# Patient Record
Sex: FEMALE | Race: WHITE | NOT HISPANIC OR LATINO | Employment: STUDENT | URBAN - METROPOLITAN AREA
[De-identification: names, ages, dates, MRNs, and addresses within clinical notes are randomized per-mention and may not be internally consistent; named-entity substitution may affect disease eponyms.]

---
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
## 2023-07-08 ENCOUNTER — OFFICE VISIT (OUTPATIENT)
Dept: URGENT CARE | Facility: URGENT CARE | Age: 18
End: 2023-07-08
Payer: COMMERCIAL

## 2023-07-08 VITALS
OXYGEN SATURATION: 100 % | HEART RATE: 71 BPM | HEIGHT: 66 IN | RESPIRATION RATE: 16 BRPM | TEMPERATURE: 99.3 F | BODY MASS INDEX: 22.5 KG/M2 | SYSTOLIC BLOOD PRESSURE: 118 MMHG | WEIGHT: 140 LBS | DIASTOLIC BLOOD PRESSURE: 70 MMHG

## 2023-07-08 DIAGNOSIS — L73.9 FOLLICULITIS: ICD-10-CM

## 2023-07-08 DIAGNOSIS — R59.0 AXILLARY LYMPHADENOPATHY: Primary | ICD-10-CM

## 2023-07-08 PROCEDURE — 99213 OFFICE O/P EST LOW 20 MIN: CPT | Performed by: STUDENT IN AN ORGANIZED HEALTH CARE EDUCATION/TRAINING PROGRAM

## 2023-07-08 RX ORDER — ESCITALOPRAM OXALATE 20 MG/1
TABLET ORAL
COMMUNITY
Start: 2023-06-03

## 2023-07-08 RX ORDER — ESCITALOPRAM OXALATE 10 MG/1
TABLET ORAL
COMMUNITY
Start: 2023-05-31

## 2023-07-08 NOTE — PROGRESS NOTES
"Assessment & Plan     Axillary lymphadenopathy  Physical examination is consistent with a normal breast examination and a small lump in the right axilla that is most likely a reactive lymph node based on the patient history and exam. Recommended that she continue to monitor and if there is worsening pain, increased size or persistence for greater than 2-3 months that she should be re-evaluated at that time. No acute concerns. She has a small nodule over her left neck that likely represents a folliculitis and recommended emollient for softening and improvement.      16 minutes spent by me on the date of the encounter doing chart review, history and exam, documentation and further activities per the note. Billed based on complexity.     No follow-ups on file.    Yvonne Hanna MD  [facility] U[facility]ENT [facility] ALEX Dexter is a 18 year old female who presents to clinic today for the following health issues:  Chief Complaint   Patient presents with     Urgent Care     Small lump under right axillary. Unsure how long its been there.       HPI    Lump in the right underarm. Unsure of how it has been there. Noticed yesterday for the first time. Lump is not painful. Has had pain inconsistently in the same location. No recent illness. No drainage from the area. Usually shaves her underarms. No breast pain, changes in the breasts, rashes over the breasts. There is a bump on the back of her neck that she is concerned might be a pimple that has been there for at least two weeks. Not painful, no drainage. Denies headache or neck pain.     Review of Systems  Constitutional, HEENT, cardiovascular, pulmonary, gi and gu systems are negative, except as otherwise noted.      Objective    /70   Pulse 71   Temp 99.3  F (37.4  C) (Tympanic)   Resp 16   Ht 1.676 m (5' 6\")   Wt 63.5 kg (140 lb)   LMP 06/10/2023 (Approximate)   SpO2 100%   BMI 22.60 kg/m    Physical Exam   GENERAL: healthy, alert and " no distress  NECK: no adenopathy, no asymmetry, masses, or scars and thyroid normal to palpation  BREAST: normal without masses, tenderness or nipple discharge and axillary findings: 0.5cm lymphadenopathy in right acx  CV: regular rate and rhythm, normal S1 S2, no S3 or S4, no murmur, click or rub, no peripheral edema and peripheral pulses strong  MS: no gross musculoskeletal defects noted, no edema  SKIN: small nodule with lichenification - posterior left neck

## 2023-10-08 ENCOUNTER — HEALTH MAINTENANCE LETTER (OUTPATIENT)
Age: 18
End: 2023-10-08

## 2024-11-30 ENCOUNTER — HEALTH MAINTENANCE LETTER (OUTPATIENT)
Age: 19
End: 2024-11-30

## 2025-06-18 ENCOUNTER — OFFICE VISIT (OUTPATIENT)
Dept: FAMILY MEDICINE | Facility: CLINIC | Age: 20
End: 2025-06-18
Payer: COMMERCIAL

## 2025-06-18 VITALS
HEART RATE: 84 BPM | BODY MASS INDEX: 22.29 KG/M2 | TEMPERATURE: 98.6 F | HEIGHT: 67 IN | OXYGEN SATURATION: 98 % | DIASTOLIC BLOOD PRESSURE: 65 MMHG | SYSTOLIC BLOOD PRESSURE: 102 MMHG | RESPIRATION RATE: 16 BRPM | WEIGHT: 142 LBS

## 2025-06-18 DIAGNOSIS — Z00.00 HEALTHCARE MAINTENANCE: ICD-10-CM

## 2025-06-18 DIAGNOSIS — J34.2 NASAL SEPTAL DEVIATION: Primary | ICD-10-CM

## 2025-06-18 LAB — HIV 1+2 AB+HIV1 P24 AG SERPL QL IA: NONREACTIVE

## 2025-06-18 PROCEDURE — 99213 OFFICE O/P EST LOW 20 MIN: CPT | Mod: 25 | Performed by: FAMILY MEDICINE

## 2025-06-18 PROCEDURE — 90471 IMMUNIZATION ADMIN: CPT | Performed by: FAMILY MEDICINE

## 2025-06-18 PROCEDURE — 91320 SARSCV2 VAC 30MCG TRS-SUC IM: CPT | Performed by: FAMILY MEDICINE

## 2025-06-18 PROCEDURE — 87389 HIV-1 AG W/HIV-1&-2 AB AG IA: CPT | Performed by: FAMILY MEDICINE

## 2025-06-18 PROCEDURE — 36415 COLL VENOUS BLD VENIPUNCTURE: CPT | Performed by: FAMILY MEDICINE

## 2025-06-18 PROCEDURE — 3078F DIAST BP <80 MM HG: CPT | Performed by: FAMILY MEDICINE

## 2025-06-18 PROCEDURE — 90480 ADMN SARSCOV2 VAC 1/ONLY CMP: CPT | Performed by: FAMILY MEDICINE

## 2025-06-18 PROCEDURE — 3074F SYST BP LT 130 MM HG: CPT | Performed by: FAMILY MEDICINE

## 2025-06-18 PROCEDURE — 90651 9VHPV VACCINE 2/3 DOSE IM: CPT | Performed by: FAMILY MEDICINE

## 2025-06-18 RX ORDER — DULOXETIN HYDROCHLORIDE 60 MG/1
60 CAPSULE, DELAYED RELEASE ORAL 2 TIMES DAILY
COMMUNITY
Start: 2024-01-01

## 2025-06-18 NOTE — TELEPHONE ENCOUNTER
FUTURE VISIT INFORMATION:  Appointment Date: 06/27/2025  Appointment Time: 2:40 PM    REFERRAL INFORMATION:  Referring By: Ishaan Putnam MD   Referring Clinic: Family Medicine   Reason for Visit/Diagnosis: Nasal septal deviation [J34.2]  REF BY Ishaan Putnam MD  RECS IN [facility]  CONF LOC  SCHED W/PT      NOTES STATUS DETAILS   OFFICE NOTE from referring provider [facility]   06/18/2025 OV with Ishaan Putnam MD

## 2025-06-18 NOTE — PROGRESS NOTES
Prior to immunization administration, verified patients identity using patient s name and date of birth. Please see Immunization Activity for additional information.     Screening Questionnaire for Adult Immunization    Are you sick today?   No   Do you have allergies to medications, food, a vaccine component or latex?   No   Have you ever had a serious reaction after receiving a vaccination?   No   Do you have a long-term health problem with heart, lung, kidney, or metabolic disease (e.g., diabetes), asthma, a blood disorder, no spleen, complement component deficiency, a cochlear implant, or a spinal fluid leak?  Are you on long-term aspirin therapy?   No   Do you have cancer, leukemia, HIV/AIDS, or any other immune system problem?   No   Do you have a parent, brother, or sister with an immune system problem?   No   In the past 3 months, have you taken medications that affect  your immune system, such as prednisone, other steroids, or anticancer drugs; drugs for the treatment of rheumatoid arthritis, Crohn s disease, or psoriasis; or have you had radiation treatments?   No   Have you had a seizure, or a brain or other nervous system problem?   No   During the past year, have you received a transfusion of blood or blood    products, or been given immune (gamma) globulin or antiviral drug?   No   For women: Are you pregnant or is there a chance you could become       pregnant during the next month?   No   Have you received any vaccinations in the past 4 weeks?   No     Immunization questionnaire answers were all negative.      Patient instructed to remain in clinic for 15 minutes afterwards, and to report any adverse reactions.     Screening performed by Yandy Valentine CMA on 6/18/2025 at 9:23 AM.        Answers submitted by the patient for this visit:  General Questionnaire (Submitted on 6/17/2025)  Chief Complaint: Chronic problems general questions HPI Form  How many days per week do you miss taking your  medication?: 1  What makes it hard for you to take your medication every day?: remembering to take  General Concern (Submitted on 6/17/2025)  Chief Complaint: Chronic problems general questions HPI Form  What is the reason for your visit today?: Possible deviated septum causing breathing problems  What are your symptoms?: Cannot breathe comfortably through left nostrile  How would you describe these symptoms?: Moderate  Are your symptoms:: Staying the same  Have you had these symptoms before?: Yes  Have you tried or received treatment for these symptoms before?: No  Questionnaire about: Chronic problems general questions HPI Form (Submitted on 6/17/2025)  Chief Complaint: Chronic problems general questions HPI Form

## 2025-06-18 NOTE — PROGRESS NOTES
"  {PROVIDER CHARTING PREFERENCE:906880}    Cliff Dexter is a 20 year old, presenting for the following health issues:  Nasal Problem (Would like a referral for ENT, trouble breathing out of left nostril, thinks it happened after a fall in the 7th grade and didn't realize )      6/18/2025     9:20 AM   Additional Questions   Roomed by Tia   Accompanied by Self     History of Present Illness       Reason for visit:  Possible deviated septum causing breathing problems  Symptoms include:  Cannot breathe comfortably through left nostrile  Symptom intensity:  Moderate  Symptom progression:  Staying the same  Had these symptoms before:  Yes  Has tried/received treatment for these symptoms:  No She is missing 1 dose(s) of medications per week.  She is not taking prescribed medications regularly due to remembering to take.        {MA/LPN/RN Pre-Provider Visit Orders- hCG/UA/Strep (Optional):638050}  {SUPERLIST (Optional):464868}  {additonal problems for provider to add (Optional):256757}    {ROS Picklists (Optional):514594}      Objective    /65 (BP Location: Right arm, Patient Position: Sitting, Cuff Size: Adult Regular)   Pulse 84   Temp 98.6  F (37  C) (Temporal)   Resp 16   Ht 1.707 m (5' 7.21\")   Wt 64.4 kg (142 lb)   LMP 05/18/2025 (Approximate)   SpO2 98%   BMI 22.10 kg/m    Body mass index is 22.1 kg/m .  Physical Exam   {Exam List (Optional):406939}    {Diagnostic Test Results (Optional):357028}        Signed Electronically by: Ishaan Putnam MD  {Email feedback regarding this note to [email or web address]   :486773}  "

## 2025-06-18 NOTE — PROGRESS NOTES
Assessment/ Plan  1. Nasal septal deviation (Primary)  Patient has started running at [employer] in [place] lately.  Has noted that she really cannot breathe out of her left nostril.  Does have some issues with allergies and in the past has been using Claritin and Flonase but these seem to have little impact on this.  Believes she may have septal deviation.  Fell in hit her nose hard, probably passed out, when she was 11.  Lots of blood.  Did not take much of it, healed okay but now the breathing problem.    Nasal passage on the left side is significantly less patent, no sign of inflammation or edema of the nasal mucosa.    Will refer to ENT  - Adult ENT  Referral; Future    2. Healthcare maintenance  Agreeable to STD screening though she believes she is very low risk.  Due for HPV boosters.  Will give the first today, agreeable to COVID.  Due for Pap smear, prefers to see female provider  Body mass index is 22.1 kg/m .    Subjective  CC:  chief complaint  HPI:  History as above.  Patient Active Problem List   Diagnosis    Bloating    Allergies    Osgood-Schlatter's disease     Current medications reviewed as follows:  Current Outpatient Medications   Medication Sig Dispense Refill    DULoxetine (CYMBALTA) 60 MG capsule Take 60 mg by mouth 2 times daily.      LORazepam (ATIVAN) 1 MG tablet TAKE ONE TABLET BY MOUTH DAILY AS NEEDED FOR ANXIETY.      busPIRone (BUSPAR) 15 MG tablet Take 30 mg by mouth At Bedtime (Patient not taking: Reported on 7/8/2023)      escitalopram (LEXAPRO) 10 MG tablet take one tablet BY MOUTH daily. TAKE WITH 20MG TABLET FOR A TOTAL DAILY DOSE OF 30MG.      escitalopram (LEXAPRO) 20 MG tablet TAKE 1 Tablet BY MOUTH ONCE DAILY. take with 10mg tablet FOR A total daily DOSE of 30mg.      melatonin 3 mg Tab tablet [MELATONIN 3 MG TAB TABLET] Take 3 mg by mouth at bedtime as needed. (Patient not taking: Reported on 7/14/2022)       No current facility-administered medications for this  "visit.      History   Smoking Status    Never   Smokeless Tobacco    Never     Social History     Social History Narrative    Not on file     Patient Care Team:  No Ref-Primary, Physician as PCP - General  Oscar Bosch MD as Assigned PCP      Objective  Physical Exam  Vitals:    06/18/25 0921   BP: 102/65   BP Location: Right arm   Patient Position: Sitting   Cuff Size: Adult Regular   Pulse: 84   Resp: 16   Temp: 98.6  F (37  C)   TempSrc: Temporal   SpO2: 98%   Weight: 64.4 kg (142 lb)   Height: 1.707 m (5' 7.21\")     Nares exam as described above.  Diagnostics  Pending    Please note: Voice recognition software was used in this dictation.  It may therefore contain typographical errors.    Answers submitted by the patient for this visit:  General Questionnaire (Submitted on 6/17/2025)  Chief Complaint: Chronic problems general questions HPI Form  How many days per week do you miss taking your medication?: 1  What makes it hard for you to take your medication every day?: remembering to take  General Concern (Submitted on 6/17/2025)  Chief Complaint: Chronic problems general questions HPI Form  What is the reason for your visit today?: Possible deviated septum causing breathing problems  What are your symptoms?: Cannot breathe comfortably through left nostrile  How would you describe these symptoms?: Moderate  Are your symptoms:: Staying the same  Have you had these symptoms before?: Yes  Have you tried or received treatment for these symptoms before?: No  Questionnaire about: Chronic problems general questions HPI Form (Submitted on 6/17/2025)  Chief Complaint: Chronic problems general questions HPI Form    "

## 2025-06-27 ENCOUNTER — OFFICE VISIT (OUTPATIENT)
Dept: OTOLARYNGOLOGY | Facility: CLINIC | Age: 20
End: 2025-06-27
Attending: FAMILY MEDICINE
Payer: COMMERCIAL

## 2025-06-27 ENCOUNTER — PRE VISIT (OUTPATIENT)
Dept: OTOLARYNGOLOGY | Facility: CLINIC | Age: 20
End: 2025-06-27

## 2025-06-27 VITALS
WEIGHT: 140.3 LBS | BODY MASS INDEX: 22.02 KG/M2 | DIASTOLIC BLOOD PRESSURE: 75 MMHG | HEART RATE: 104 BPM | SYSTOLIC BLOOD PRESSURE: 111 MMHG | OXYGEN SATURATION: 99 % | HEIGHT: 67 IN

## 2025-06-27 DIAGNOSIS — J34.2 NASAL SEPTAL DEVIATION: Primary | ICD-10-CM

## 2025-06-27 PROCEDURE — 1126F AMNT PAIN NOTED NONE PRSNT: CPT | Performed by: STUDENT IN AN ORGANIZED HEALTH CARE EDUCATION/TRAINING PROGRAM

## 2025-06-27 PROCEDURE — 99203 OFFICE O/P NEW LOW 30 MIN: CPT | Mod: 25 | Performed by: STUDENT IN AN ORGANIZED HEALTH CARE EDUCATION/TRAINING PROGRAM

## 2025-06-27 PROCEDURE — 3074F SYST BP LT 130 MM HG: CPT | Performed by: STUDENT IN AN ORGANIZED HEALTH CARE EDUCATION/TRAINING PROGRAM

## 2025-06-27 PROCEDURE — 31231 NASAL ENDOSCOPY DX: CPT | Performed by: STUDENT IN AN ORGANIZED HEALTH CARE EDUCATION/TRAINING PROGRAM

## 2025-06-27 PROCEDURE — 3078F DIAST BP <80 MM HG: CPT | Performed by: STUDENT IN AN ORGANIZED HEALTH CARE EDUCATION/TRAINING PROGRAM

## 2025-06-27 ASSESSMENT — PAIN SCALES - GENERAL: PAINLEVEL_OUTOF10: NO PAIN (0)

## 2025-06-27 NOTE — PATIENT INSTRUCTIONS
You were seen in the ENT Clinic today by Dr. Frazier. If you have any questions or concerns after your appointment, please contact us (see below)    The following has been recommended for you based upon your appointment today:  We are reaching out to our facial plastics team to see if we might be able to accommodate a consult and surgery prior to the end of August. Someone will reach out to you with a plan once we have determined referral timing.      How to Contact Us:  Send a [employer] message to your provider. Our team will respond to you via [employer]. Occasionally, we will need to call you to get further information.  For urgent matters (Monday-Friday), call the ENT Clinic: [phone] and speak with a call center team member - they will route your call appropriately.   If you'd like to speak directly with a nurse, please find our contact information below. We do our best to check voicemail frequently throughout the day, and will work to call you back within 1-2 days. For urgent matters, please use the general clinic phone numbers listed above.    Brittanie DAVIS RN, BSN   RN Care Coordinator, ENT Clinic  [facility]  Direct: [phone]  Ana Paula GUILLEN LPN  Direct: [phone]

## 2025-06-27 NOTE — NURSING NOTE
"Chief Complaint   Patient presents with    Consult   Blood pressure 111/75, pulse 104, height 1.707 m (5' 7.21\"), weight 63.6 kg (140 lb 4.8 oz), last menstrual period 05/18/2025, SpO2 99%, not currently breastfeeding. Dontae Reis, EMT    "

## 2025-06-27 NOTE — LETTER
"6/27/2025       RE: Guerita Bray  [address] [place] [address]     Dear Colleague,    Thank you for referring your patient, Guerita Bray, to the [facility] [place] [facility]. Please see a copy of my visit note below.      [facility] - Rhinology & Skull Base Surgery  New Patient Visit      Encounter date:   June 28, 2025    Referring Provider:  Ishaan Putnam MD  [address] [place] [id number]    Assessment, Decision Making, and Plan:  (J34.2) Nasal septal deviation  Comment:   --dorsal septal deviation narrowing the left INV with symptomatic nasal obstruction  --has been on topical steroid spray > 1 month with no resolution of symptoms  --history of nasal trauma with possible post-traumatic bony deformity of the right bony nasal dorusm   --given the location of the deviation, she would require septorhinoplasty to correct; additionally she interested in possibly bony work to reduce the ridge  Plan: IMAGESTREAM RECORDING ORDER  --we will help arrange evaluation with the [facility] team  --we will see whether we can get a consult in prior to August when she returns to school    History of Present Illness:   Guerita Bray is a 20 year old female who presents for consultation regarding nasal obstruction.    Long standing left sided  Has possible seasonal allergy, but obstructive symptoms are year round  History of nasal trauma - fall with possible LOC @ 12yo  Had bleeding at the time, suspected possible fracture, no intervention at the time  Obstruction is bothersome - affects sleep and exercise while running  Has tried topical steroid spray for 2+ months on a daily basis with no resolution of symptoms    Physical Exam:  Vital signs: /75 (BP Location: Left arm, Patient Position: Sitting, Cuff Size: Adult Regular)   Pulse 104   Ht 1.707 m (5' 7.21\")   Wt 63.6 kg (140 lb 4.8 oz)  "  LMP 05/18/2025 (Approximate)   SpO2 99%   BMI 21.84 kg/m     General Appearance: No acute distress, appropriate demeanor, conversant  Eyes: moist conjunctivae; EOMI; pupils symmetric; visual acuity grossly intact; no proptosis  Head: normocephalic; overall symmetric appearance without deformity  Face: overall symmetric without deformity  Ears: Normal appearance of external ear; external meatus normal in appearance; TMs intact without perforation bilaterally;   Nose: No external deformity; septum (dorsal septal deviation to the left narrowing the valve) ; inferior turbinates (non obstructing)   --> 50% improvement with modified petra on the left  Oral Cavity/oropharynx: Normal appearance of mucosa; tongue midline; no mass or lesions; oropharynx without obvious mucosal abnormality  Neck: no palpable lymphadenopathy; thyroid without palpable nodules  Lungs: symmetric chest rise; no wheezing  CV: Good distal perfusion; normal hear rate  Extremities: No deformity  Neurologic Exam: Cranial nerves II-XII are grossly intact; no focal deficit    Procedure Note  Procedure performed: Rigid nasal endoscopy  Indication: To evaluate for sinonasal pathology not visualized on routine anterior rhinoscopy  Anesthesia: 4% topical lidocaine with 0.05% oxymetazoline  Description of procedure: A 0-degree, 4 mm rigid endoscope was inserted into bilateral nasal cavities and the inferior and middle turbinates, nasal valves, nasal cavity, inferior meatus, middle meatus, sphenoethmoid recess, and nasopharynx were thoroughly evaluated for evidence of obstruction, edema, purulence, polyps and/or mass/lesion.     Findings:    MM SER NP clear    The patient tolerated the procedure well without complication.     Сергей Frazier MD    Rhinology  Endoscopic Skull Base Surgery  [facility]  Department of Otolaryngology - Head & Neck Surgery          Again, thank you for allowing me to participate in the care of  your patient.      Sincerely,    Сергей Frazier MD

## 2025-06-28 NOTE — PROGRESS NOTES
"  [facility] - Rhinology & Skull Base Surgery  New Patient Visit      Encounter date:   June 28, 2025    Referring Provider:  Ishaan Putnam MD  [address] [place], [place] [id number]    Assessment, Decision Making, and Plan:  (J34.2) Nasal septal deviation  Comment:   --dorsal septal deviation narrowing the left INV with symptomatic nasal obstruction  --has been on topical steroid spray > 1 month with no resolution of symptoms  --history of nasal trauma with possible post-traumatic bony deformity of the right bony nasal dorusm   --given the location of the deviation, she would require septorhinoplasty to correct; additionally she interested in possibly bony work to reduce the ridge  Plan: IMAGESTREAM RECORDING ORDER  --we will help arrange evaluation with the [facility] team  --we will see whether we can get a consult in prior to August when she returns to school    History of Present Illness:   Guerita Bray is a 20 year old female who presents for consultation regarding nasal obstruction.    Long standing left sided  Has possible seasonal allergy, but obstructive symptoms are year round  History of nasal trauma - fall with possible LOC @ 12yo  Had bleeding at the time, suspected possible fracture, no intervention at the time  Obstruction is bothersome - affects sleep and exercise while running  Has tried topical steroid spray for 2+ months on a daily basis with no resolution of symptoms    Physical Exam:  Vital signs: /75 (BP Location: Left arm, Patient Position: Sitting, Cuff Size: Adult Regular)   Pulse 104   Ht 1.707 m (5' 7.21\")   Wt 63.6 kg (140 lb 4.8 oz)   LMP 05/18/2025 (Approximate)   SpO2 99%   BMI 21.84 kg/m     General Appearance: No acute distress, appropriate demeanor, conversant  Eyes: moist conjunctivae; EOMI; pupils symmetric; visual acuity grossly intact; no proptosis  Head: normocephalic; overall symmetric appearance without deformity  Face: overall symmetric without " deformity  Ears: Normal appearance of external ear; external meatus normal in appearance; TMs intact without perforation bilaterally;   Nose: No external deformity; septum (dorsal septal deviation to the left narrowing the valve) ; inferior turbinates (non obstructing)   --> 50% improvement with modified petra on the left  Oral Cavity/oropharynx: Normal appearance of mucosa; tongue midline; no mass or lesions; oropharynx without obvious mucosal abnormality  Neck: no palpable lymphadenopathy; thyroid without palpable nodules  Lungs: symmetric chest rise; no wheezing  CV: Good distal perfusion; normal hear rate  Extremities: No deformity  Neurologic Exam: Cranial nerves II-XII are grossly intact; no focal deficit    Procedure Note  Procedure performed: Rigid nasal endoscopy  Indication: To evaluate for sinonasal pathology not visualized on routine anterior rhinoscopy  Anesthesia: 4% topical lidocaine with 0.05% oxymetazoline  Description of procedure: A 0-degree, 4 mm rigid endoscope was inserted into bilateral nasal cavities and the inferior and middle turbinates, nasal valves, nasal cavity, inferior meatus, middle meatus, sphenoethmoid recess, and nasopharynx were thoroughly evaluated for evidence of obstruction, edema, purulence, polyps and/or mass/lesion.     Findings:    MM SER NP clear    The patient tolerated the procedure well without complication.     Сергей Frazier MD    Rhinology  Endoscopic Skull Base Surgery  [facility]  Department of Otolaryngology - Head & Neck Surgery

## 2025-08-11 ENCOUNTER — OFFICE VISIT (OUTPATIENT)
Dept: FAMILY MEDICINE | Facility: CLINIC | Age: 20
End: 2025-08-11
Payer: COMMERCIAL

## 2025-08-11 VITALS
RESPIRATION RATE: 18 BRPM | WEIGHT: 138.6 LBS | TEMPERATURE: 98.2 F | DIASTOLIC BLOOD PRESSURE: 70 MMHG | HEIGHT: 67 IN | OXYGEN SATURATION: 99 % | HEART RATE: 62 BPM | SYSTOLIC BLOOD PRESSURE: 102 MMHG | BODY MASS INDEX: 21.75 KG/M2

## 2025-08-11 DIAGNOSIS — F42.9 OBSESSIVE-COMPULSIVE DISORDER, UNSPECIFIED TYPE: ICD-10-CM

## 2025-08-11 DIAGNOSIS — F41.1 GAD (GENERALIZED ANXIETY DISORDER): Primary | ICD-10-CM

## 2025-08-11 DIAGNOSIS — F33.0 MILD EPISODE OF RECURRENT MAJOR DEPRESSIVE DISORDER: ICD-10-CM

## 2025-08-11 PROCEDURE — G2211 COMPLEX E/M VISIT ADD ON: HCPCS | Performed by: STUDENT IN AN ORGANIZED HEALTH CARE EDUCATION/TRAINING PROGRAM

## 2025-08-11 PROCEDURE — 1126F AMNT PAIN NOTED NONE PRSNT: CPT | Performed by: STUDENT IN AN ORGANIZED HEALTH CARE EDUCATION/TRAINING PROGRAM

## 2025-08-11 PROCEDURE — 99214 OFFICE O/P EST MOD 30 MIN: CPT | Performed by: STUDENT IN AN ORGANIZED HEALTH CARE EDUCATION/TRAINING PROGRAM

## 2025-08-11 PROCEDURE — 3078F DIAST BP <80 MM HG: CPT | Performed by: STUDENT IN AN ORGANIZED HEALTH CARE EDUCATION/TRAINING PROGRAM

## 2025-08-11 PROCEDURE — 3074F SYST BP LT 130 MM HG: CPT | Performed by: STUDENT IN AN ORGANIZED HEALTH CARE EDUCATION/TRAINING PROGRAM

## 2025-08-11 RX ORDER — DULOXETIN HYDROCHLORIDE 60 MG/1
60 CAPSULE, DELAYED RELEASE ORAL 2 TIMES DAILY
Qty: 180 CAPSULE | Refills: 0 | Status: SHIPPED | OUTPATIENT
Start: 2025-08-11 | End: 2025-11-09

## 2025-08-11 RX ORDER — LORAZEPAM 1 MG/1
1 TABLET ORAL DAILY PRN
Qty: 30 TABLET | Refills: 0 | Status: SHIPPED | OUTPATIENT
Start: 2025-08-11

## 2025-08-11 ASSESSMENT — PAIN SCALES - GENERAL: PAINLEVEL_OUTOF10: NO PAIN (0)
